# Patient Record
Sex: MALE | Race: BLACK OR AFRICAN AMERICAN | NOT HISPANIC OR LATINO | Employment: FULL TIME | URBAN - METROPOLITAN AREA
[De-identification: names, ages, dates, MRNs, and addresses within clinical notes are randomized per-mention and may not be internally consistent; named-entity substitution may affect disease eponyms.]

---

## 2021-05-17 ENCOUNTER — HISTORICAL (OUTPATIENT)
Dept: ADMINISTRATIVE | Facility: HOSPITAL | Age: 41
End: 2021-05-17

## 2021-05-17 LAB
HAV IGM SERPL QL IA: NONREACTIVE
HBV CORE IGM SERPL QL IA: NONREACTIVE
HBV SURFACE AG SERPL QL IA: NONREACTIVE
HCV AB SERPL QL IA: NONREACTIVE
HIV 1+2 AB+HIV1 P24 AG SERPL QL IA: NONREACTIVE
T PALLIDUM AB SER QL: NONREACTIVE

## 2022-04-11 ENCOUNTER — HISTORICAL (OUTPATIENT)
Dept: ADMINISTRATIVE | Facility: HOSPITAL | Age: 42
End: 2022-04-11

## 2022-04-28 VITALS
SYSTOLIC BLOOD PRESSURE: 144 MMHG | BODY MASS INDEX: 30.3 KG/M2 | OXYGEN SATURATION: 100 % | WEIGHT: 199.94 LBS | HEIGHT: 68 IN | DIASTOLIC BLOOD PRESSURE: 99 MMHG

## 2024-03-03 ENCOUNTER — HOSPITAL ENCOUNTER (EMERGENCY)
Facility: HOSPITAL | Age: 44
Discharge: HOME | End: 2024-03-03
Attending: FAMILY MEDICINE
Payer: COMMERCIAL

## 2024-03-03 VITALS
HEIGHT: 70 IN | OXYGEN SATURATION: 99 % | HEART RATE: 104 BPM | BODY MASS INDEX: 29.13 KG/M2 | WEIGHT: 203.5 LBS | RESPIRATION RATE: 17 BRPM | TEMPERATURE: 98 F | DIASTOLIC BLOOD PRESSURE: 88 MMHG | SYSTOLIC BLOOD PRESSURE: 131 MMHG

## 2024-03-03 DIAGNOSIS — L02.214 ABSCESS OF RIGHT GROIN: Primary | ICD-10-CM

## 2024-03-03 PROCEDURE — 99284 EMERGENCY DEPT VISIT MOD MDM: CPT

## 2024-03-03 RX ORDER — DOXYCYCLINE 100 MG/1
100 CAPSULE ORAL 2 TIMES DAILY
Qty: 20 CAPSULE | Refills: 0 | Status: SHIPPED | OUTPATIENT
Start: 2024-03-03 | End: 2024-03-13

## 2024-03-03 RX ORDER — NAPROXEN 500 MG/1
500 TABLET ORAL 2 TIMES DAILY PRN
Qty: 20 TABLET | Refills: 0 | Status: SHIPPED | OUTPATIENT
Start: 2024-03-03 | End: 2024-03-13

## 2024-03-03 NOTE — ED PROVIDER NOTES
"Encounter Date: 3/3/2024       History     Chief Complaint   Patient presents with    Abscess     States right groin abscess x 3 days next to testicle.  States is draining "a little".       Patient is a 43-year-old gentleman presents emergency room complaints of an abscess to the right groin area that has been ongoing for the last 2 days.  Denies fever chills.  Denies nausea or vomiting.  Patient reports area has been spontaneously draining, but due to his discomfort, decided come the emergency room for evaluation.    The history is provided by the patient.     Review of patient's allergies indicates:  No Known Allergies  History reviewed. No pertinent past medical history.  History reviewed. No pertinent surgical history.  History reviewed. No pertinent family history.  Social History     Tobacco Use    Smoking status: Every Day     Types: Cigarettes    Smokeless tobacco: Never   Substance Use Topics    Alcohol use: Yes    Drug use: Yes     Types: Marijuana     Review of Systems   Constitutional:  Negative for chills, fatigue and fever.   HENT:  Negative for ear pain, rhinorrhea and sore throat.    Eyes:  Negative for photophobia and pain.   Respiratory:  Negative for cough, shortness of breath and wheezing.    Cardiovascular:  Negative for chest pain.   Gastrointestinal:  Negative for abdominal pain, diarrhea, nausea and vomiting.   Genitourinary:  Negative for dysuria.   Neurological:  Negative for dizziness, weakness and headaches.   All other systems reviewed and are negative.      Physical Exam     Initial Vitals [03/03/24 0548]   BP Pulse Resp Temp SpO2   131/88 104 17 98.1 °F (36.7 °C) 99 %      MAP       --         Physical Exam    Nursing note and vitals reviewed.  Constitutional: He appears well-developed and well-nourished.   HENT:   Head: Normocephalic and atraumatic.   Eyes: EOM are normal. Pupils are equal, round, and reactive to light.   Neck: Neck supple.   Normal range of motion.  Cardiovascular:  " Normal rate, regular rhythm, normal heart sounds and intact distal pulses.     Exam reveals no gallop and no friction rub.       No murmur heard.  Pulmonary/Chest: Breath sounds normal. No respiratory distress.   Abdominal: Abdomen is soft. Bowel sounds are normal. He exhibits no distension. There is no abdominal tenderness.   Musculoskeletal:         General: Normal range of motion.      Cervical back: Normal range of motion and neck supple.     Neurological: He is alert and oriented to person, place, and time. He has normal strength.   Skin: Skin is warm and dry. Capillary refill takes less than 2 seconds.   1 cm x 1 cm subcutaneous nodule that is currently actively draining in the right inguinal region, just lateral to the scrotum.  No significant induration.   Psychiatric: He has a normal mood and affect. His behavior is normal. Judgment and thought content normal.         ED Course   Procedures  Labs Reviewed - No data to display       Imaging Results    None          Medications - No data to display  Medical Decision Making  Patient is a 43-year-old gentleman presents emergency room with complaints of a subcutaneous abscess that is spontaneously draining at present.  Discussed with patient regarding options.  Since spontaneously draining, recommend warm compresses to the area.  Will place on Bactrim.  Stable for discharge to home.  ER precautions given for any acute worsening.                                      Clinical Impression:  Final diagnoses:  [L02.214] Abscess of right groin (Primary)          ED Disposition Condition    Discharge Stable          ED Prescriptions       Medication Sig Dispense Start Date End Date Auth. Provider    doxycycline (VIBRAMYCIN) 100 MG Cap Take 1 capsule (100 mg total) by mouth 2 (two) times daily. for 10 days 20 capsule 3/3/2024 3/13/2024 Bradley Wray MD    naproxen (NAPROSYN) 500 MG tablet Take 1 tablet (500 mg total) by mouth 2 (two) times daily as needed  (pain). 20 tablet 3/3/2024 3/13/2024 Bradley Wray MD          Follow-up Information       Follow up With Specialties Details Why Contact Info    [facility] - Emergency Dept Emergency Medicine  As needed, If symptoms worsen [address] [facility] [id number] [phone]    Primary Care Physician  In 5 days               Bradley Wray MD  03/03/24 0607

## 2024-03-19 ENCOUNTER — HOSPITAL ENCOUNTER (EMERGENCY)
Facility: HOSPITAL | Age: 44
Discharge: HOME | End: 2024-03-19
Attending: EMERGENCY MEDICINE
Payer: COMMERCIAL

## 2024-03-19 VITALS
SYSTOLIC BLOOD PRESSURE: 151 MMHG | DIASTOLIC BLOOD PRESSURE: 102 MMHG | BODY MASS INDEX: 28.79 KG/M2 | TEMPERATURE: 98 F | OXYGEN SATURATION: 100 % | HEART RATE: 92 BPM | RESPIRATION RATE: 16 BRPM | WEIGHT: 200.63 LBS

## 2024-03-19 DIAGNOSIS — M25.512 ACUTE PAIN OF LEFT SHOULDER: Primary | ICD-10-CM

## 2024-03-19 PROCEDURE — 99284 EMERGENCY DEPT VISIT MOD MDM: CPT | Mod: 25

## 2024-03-19 PROCEDURE — 63600175 PHARM REV CODE 636 W HCPCS: Performed by: NURSE PRACTITIONER

## 2024-03-19 PROCEDURE — 96372 THER/PROPH/DIAG INJ SC/IM: CPT | Performed by: NURSE PRACTITIONER

## 2024-03-19 RX ORDER — DICLOFENAC SODIUM 75 MG/1
75 TABLET, DELAYED RELEASE ORAL 2 TIMES DAILY PRN
Qty: 20 TABLET | Refills: 0 | Status: SHIPPED | OUTPATIENT
Start: 2024-03-19

## 2024-03-19 RX ORDER — KETOROLAC TROMETHAMINE 30 MG/ML
30 INJECTION, SOLUTION INTRAMUSCULAR; INTRAVENOUS
Status: COMPLETED | OUTPATIENT
Start: 2024-03-19 | End: 2024-03-19

## 2024-03-19 RX ORDER — TIZANIDINE 4 MG/1
8 TABLET ORAL EVERY 6 HOURS PRN
Qty: 30 TABLET | Refills: 0 | Status: SHIPPED | OUTPATIENT
Start: 2024-03-19 | End: 2024-03-29

## 2024-03-19 RX ADMIN — KETOROLAC TROMETHAMINE 30 MG: 30 INJECTION, SOLUTION INTRAMUSCULAR at 03:03

## 2024-03-19 NOTE — ED PROVIDER NOTES
Encounter Date: 3/19/2024       History     Chief Complaint   Patient presents with    Shoulder Pain     PT W CO RT SHOULDER PAIN AFTER FALL ON SAT.  NVI.       The patient presents with left shoulder pain.  The onset was 1 week ago.  The course/duration of symptoms is constant.  Type of injury: fell going down stairs.  Location: left shoulder. Radiating pain: none. The character of symptoms is pain.  The degree of pain is moderate and with certain movements.  The degree of swelling is none.  The exacerbating factor is movement.  There are relieving factors including rest and immobilization.  Risk factors consist of none.  Prior episodes: none.  Therapy today: none.  Associated symptoms: none, he denies LOC and any other injury.  Additional history: none.         Review of patient's allergies indicates:  No Known Allergies  History reviewed. No pertinent past medical history.  History reviewed. No pertinent surgical history.  History reviewed. No pertinent family history.  Social History     Tobacco Use    Smoking status: Every Day     Current packs/day: 0.50     Types: Cigarettes    Smokeless tobacco: Never   Substance Use Topics    Alcohol use: Yes    Drug use: Yes     Types: Marijuana     Review of Systems   Constitutional:  Negative for fever.   HENT:  Negative for sore throat.    Respiratory:  Negative for shortness of breath.    Cardiovascular:  Negative for chest pain.   Gastrointestinal:  Negative for nausea.   Genitourinary:  Negative for dysuria.   Musculoskeletal:  Positive for arthralgias. Negative for back pain.   Skin:  Negative for rash.   Neurological:  Negative for weakness.   Hematological:  Does not bruise/bleed easily.   All other systems reviewed and are negative.      Physical Exam     Initial Vitals [03/19/24 1435]   BP Pulse Resp Temp SpO2   (!) 151/102 92 16 97.5 °F (36.4 °C) 100 %      MAP       --         Physical Exam    Nursing note and vitals reviewed.  Constitutional: He appears  well-developed and well-nourished.   HENT:   Head: Normocephalic and atraumatic.   Neck: Neck supple.   Normal range of motion.  Cardiovascular:  Normal rate, regular rhythm, normal heart sounds and intact distal pulses.           Pulmonary/Chest: Effort normal and breath sounds normal. He has no decreased breath sounds.   Abdominal: Abdomen is soft and flat. Bowel sounds are normal. There is no abdominal tenderness.   Musculoskeletal:         General: Normal range of motion.      Cervical back: Normal range of motion and neck supple.      Comments: Mild ttp left shoulder, FROM, good distal pulses, NVI     Neurological: He is alert and oriented to person, place, and time. He has normal strength.   Skin: Skin is warm and dry.   Psychiatric: He has a normal mood and affect.         ED Course   Procedures  Labs Reviewed - No data to display       Imaging Results              X-Ray Shoulder 2 or More Views Left (Final result)  Result time 03/19/24 15:01:20      Final result by Janett Rosenberg MD (03/19/24 15:01:20)                   Impression:      No acute bony abnormality.      Electronically signed by: Janett Rosenberg  Date:    03/19/2024  Time:    15:01               Narrative:    EXAMINATION:  XR SHOULDER COMPLETE 2 OR MORE VIEWS LEFT    CLINICAL HISTORY:  fall;    COMPARISON:  None.    FINDINGS:  There is no acute fracture or malalignment.  There are mild degenerative changes of the shoulder.  The soft tissues are unremarkable.                                       Medications   ketorolac injection 30 mg (30 mg Intramuscular Given 3/19/24 4583)     Medical Decision Making  The patient presents with left shoulder pain.  The onset was 1 week ago.  The course/duration of symptoms is constant.  Type of injury: fell going down stairs.  Location: left shoulder. Radiating pain: none. The character of symptoms is pain.  The degree of pain is moderate and with certain movements.  The degree of swelling is none.  The  exacerbating factor is movement.  There are relieving factors including rest and immobilization.  Risk factors consist of none.  Prior episodes: none.  Therapy today: none.  Associated symptoms: none, he denies LOC and any other injury.  Additional history: none.       3:31 PM DISPOSITION: The patient is resting comfortably in no acute distress.  He is hemodynamically stable and is without objective evidence for acute process requiring urgent intervention or hospitalization. I provided counseling to patient with regard to condition, the treatment plan, specific conditions for return, and the importance of follow up. Detailed written and verbal instructions provided to patient and he expressed a verbal understanding of the discharge instructions and overall management plan. Reiterated the importance of medication administration and safety and advised patient to follow up with primary care provider in 3-5 days or sooner if needed.  Answered questions at this time. The patient is stable for discharge.       Amount and/or Complexity of Data Reviewed  Radiology: ordered. Decision-making details documented in ED Course.    Risk  Prescription drug management.      Additional MDM:   Differential Diagnosis:   Fracture, septic joint, cellulitis, abscess, gout, RA, OA among others              ED Course as of 03/19/24 1531   Tue Mar 19, 2024   1529 X-Ray Shoulder 2 or More Views Left  Impression:     No acute bony abnormality.   [RB]      ED Course User Index  [RB] Darrion Marin ACNP                           Clinical Impression:  Final diagnoses:  [M25.512] Acute pain of left shoulder (Primary)          ED Disposition Condition    Discharge Stable          ED Prescriptions       Medication Sig Dispense Start Date End Date Auth. Provider    diclofenac (VOLTAREN) 75 MG EC tablet Take 1 tablet (75 mg total) by mouth 2 (two) times daily as needed (pain). 20 tablet 3/19/2024 -- Darrion Marin ACNP    tiZANidine (ZANAFLEX) 4 MG  tablet Take 2 tablets (8 mg total) by mouth every 6 (six) hours as needed (pain or spasms). May cause drowsiness 30 tablet 3/19/2024 3/29/2024 Darrion Marin ACNP          Follow-up Information       Follow up With Specialties Details Why Contact Info    follow up with your primary care provider in 3-5 days        [facility] - Emergency Dept Emergency Medicine  If symptoms worsen [address] [facility] [address] [phone]             Darrion Marin ACNP  03/19/24 0815

## 2024-05-14 ENCOUNTER — HOSPITAL ENCOUNTER (EMERGENCY)
Facility: HOSPITAL | Age: 44
Discharge: HOME | End: 2024-05-14
Attending: STUDENT IN AN ORGANIZED HEALTH CARE EDUCATION/TRAINING PROGRAM
Payer: COMMERCIAL

## 2024-05-14 VITALS
HEART RATE: 90 BPM | WEIGHT: 194.44 LBS | SYSTOLIC BLOOD PRESSURE: 149 MMHG | BODY MASS INDEX: 27.84 KG/M2 | RESPIRATION RATE: 14 BRPM | HEIGHT: 70 IN | TEMPERATURE: 96 F | OXYGEN SATURATION: 99 % | DIASTOLIC BLOOD PRESSURE: 100 MMHG

## 2024-05-14 DIAGNOSIS — J06.9 UPPER RESPIRATORY TRACT INFECTION, UNSPECIFIED TYPE: Primary | ICD-10-CM

## 2024-05-14 LAB
FLUAV AG UPPER RESP QL IA.RAPID: NOT DETECTED
FLUBV AG UPPER RESP QL IA.RAPID: NOT DETECTED
RSV A 5' UTR RNA NPH QL NAA+PROBE: NOT DETECTED
SARS-COV-2 RNA RESP QL NAA+PROBE: NOT DETECTED
STREP A PCR (OHS): NOT DETECTED

## 2024-05-14 PROCEDURE — 0241U COVID/RSV/FLU A&B PCR: CPT | Performed by: NURSE PRACTITIONER

## 2024-05-14 PROCEDURE — 87651 STREP A DNA AMP PROBE: CPT | Performed by: NURSE PRACTITIONER

## 2024-05-14 PROCEDURE — 99283 EMERGENCY DEPT VISIT LOW MDM: CPT

## 2024-05-14 RX ORDER — PROMETHAZINE HYDROCHLORIDE AND DEXTROMETHORPHAN HYDROBROMIDE 6.25; 15 MG/5ML; MG/5ML
5 SYRUP ORAL EVERY 4 HOURS PRN
Qty: 120 ML | Refills: 0 | Status: SHIPPED | OUTPATIENT
Start: 2024-05-14 | End: 2024-05-24

## 2024-05-14 NOTE — ED PROVIDER NOTES
Encounter Date: 5/14/2024       History     Chief Complaint   Patient presents with    Sore Throat     Sore throat x 1 week; hypertensive without prior hx but does not have a PCP. Requesting referral.     The patient presents with occas nonprod cough, sore throat, nasal congestion, subjective fever, no cp or sob, no known covid-19 exposure.  The onset was 1 week ago.  The course/duration of symptoms is constant.  The degree at present is minimal.  The exacerbating factor is none.  The relieving factor is none. Risk factors consist of none.  Prior episodes: occasional.  Associated symptoms: denies chest pain and denies shortness of breath.  Additional history: none.          Review of patient's allergies indicates:  No Known Allergies  History reviewed. No pertinent past medical history.  History reviewed. No pertinent surgical history.  No family history on file.  Social History     Tobacco Use    Smoking status: Every Day     Current packs/day: 0.50     Types: Cigarettes    Smokeless tobacco: Never   Substance Use Topics    Alcohol use: Yes    Drug use: Yes     Types: Marijuana     Review of Systems   Constitutional:  Positive for fever.   HENT:  Positive for congestion and sore throat.    Respiratory:  Positive for cough. Negative for shortness of breath.    Cardiovascular:  Negative for chest pain.   Gastrointestinal:  Negative for nausea.   Genitourinary:  Negative for dysuria.   Musculoskeletal:  Negative for back pain.   Skin:  Negative for rash.   Neurological:  Negative for weakness.   Hematological:  Does not bruise/bleed easily.   All other systems reviewed and are negative.      Physical Exam     Initial Vitals [05/14/24 1128]   BP Pulse Resp Temp SpO2   (!) 149/100 90 14 96.4 °F (35.8 °C) 99 %      MAP       --         Physical Exam    Nursing note and vitals reviewed.  Constitutional: He appears well-developed and well-nourished.   HENT:   Head: Normocephalic and atraumatic.   Right Ear: Tympanic  membrane normal.   Left Ear: Tympanic membrane normal.   Nose: Nose normal.   Mouth/Throat: Uvula is midline, oropharynx is clear and moist and mucous membranes are normal.   Neck: Neck supple.   Normal range of motion.  Cardiovascular:  Normal rate, regular rhythm, normal heart sounds and intact distal pulses.           Pulmonary/Chest: Effort normal and breath sounds normal. He has no decreased breath sounds.   Abdominal: Abdomen is soft and flat. Bowel sounds are normal. There is no abdominal tenderness.   Musculoskeletal:         General: Normal range of motion.      Cervical back: Normal range of motion and neck supple.     Neurological: He is alert and oriented to person, place, and time. He has normal strength.   Skin: Skin is warm and dry.   Psychiatric: He has a normal mood and affect.         ED Course   Procedures  Labs Reviewed   COVID/RSV/FLU A&B PCR - Normal    Narrative:     The Xpert Xpress SARS-CoV-2/FLU/RSV plus is a rapid, multiplexed real-time PCR test intended for the simultaneous qualitative detection and differentiation of SARS-CoV-2, Influenza A, Influenza B, and respiratory syncytial virus (RSV) viral RNA in either nasopharyngeal swab or nasal swab specimens.         STREP GROUP A BY PCR - Normal    Narrative:     The Xpert Xpress Strep A test is a rapid, qualitative in vitro diagnostic test for the detection of Streptococcus pyogenes (Group A ß-hemolytic Streptococcus, Strep A) in throat swab specimens from patients with signs and symptoms of pharyngitis.            Imaging Results    None          Medications - No data to display  Medical Decision Making  The patient presents with occas nonprod cough, sore throat, nasal congestion, subjective fever, no cp or sob, no known covid-19 exposure.  The onset was 1 week ago.  The course/duration of symptoms is constant.  The degree at present is minimal.  The exacerbating factor is none.  The relieving factor is none. Risk factors consist of none.   Prior episodes: occasional.  Associated symptoms: denies chest pain and denies shortness of breath.  Additional history: none.     1:14 PM DISPOSITION: The patient is resting comfortably in no acute distress.  He is hemodynamically stable and is without objective evidence for acute process requiring urgent intervention or hospitalization. I provided counseling to patient with regard to condition, the treatment plan, specific conditions for return, and the importance of follow up. Detailed written and verbal instructions provided to patient and he expressed a verbal understanding of the discharge instructions and overall management plan. Reiterated the importance of medication administration and safety and advised patient to follow up with primary care provider in 3-5 days or sooner if needed.  Answered questions at this time. The patient is stable for discharge.      Amount and/or Complexity of Data Reviewed  Labs: ordered.      Additional MDM:   Differential Diagnosis:   At this time differential diagnosis is but not limited to influenza, strep throat, covid, rsv, viral uri              ED Course as of 05/14/24 1315   Tue May 14, 2024   1309 STREP A PCR ([facility]): Not Detected [RB]   1309 Influenza A, Molecular: Not Detected [RB]   1309 Influenza B, Molecular: Not Detected [RB]   1310 RSV Ag by Molecular Method: Not Detected [RB]   1310 SARS-CoV2 (COVID-19) Qualitative PCR: Not Detected [RB]      ED Course User Index  [RB] Darrion Marin ACNP                           Clinical Impression:  Final diagnoses:  [J06.9] Upper respiratory tract infection, unspecified type (Primary)          ED Disposition Condition    Discharge Stable          ED Prescriptions       Medication Sig Dispense Start Date End Date Auth. Provider    promethazine-dextromethorphan (PROMETHAZINE-DM) 6.25-15 mg/5 mL Syrp Take 5 mLs by mouth every 4 (four) hours as needed (cough). 120 mL 5/14/2024 5/24/2024 Darrion Marin ACNP          Follow-up  Information       Follow up With Specialties Details Why Contact Info    follow up with your primary care provider in 3-5 days        [facility] - Emergency Dept Emergency Medicine  If symptoms worsen [address] [facility] [address] [phone]             Darrion Marin, ACNP  05/14/24 3816

## 2024-05-14 NOTE — DISCHARGE INSTRUCTIONS
It is important that you follow up with your primary care provider or specialist if indicated for further evaluation, workup, and treatment as necessary. The exam and treatment you received in Emergency Department was for an urgent problem and NOT INTENDED AS COMPLETE CARE. It is important that you FOLLOW UP with a doctor for ongoing care. If your symptoms become WORSE or you DO NOT IMPROVE and you are unable to reach your health care provider, you should RETURN to the Emergency Department.

## 2024-09-01 ENCOUNTER — HOSPITAL ENCOUNTER (EMERGENCY)
Facility: HOSPITAL | Age: 44
Discharge: HOME | End: 2024-09-01
Payer: COMMERCIAL

## 2024-09-01 VITALS
RESPIRATION RATE: 18 BRPM | DIASTOLIC BLOOD PRESSURE: 87 MMHG | HEART RATE: 86 BPM | SYSTOLIC BLOOD PRESSURE: 146 MMHG | HEIGHT: 70 IN | WEIGHT: 185 LBS | BODY MASS INDEX: 26.48 KG/M2 | OXYGEN SATURATION: 98 % | TEMPERATURE: 98 F

## 2024-09-01 DIAGNOSIS — H92.02 LEFT EAR PAIN: Primary | ICD-10-CM

## 2024-09-01 DIAGNOSIS — H61.22 IMPACTED CERUMEN OF LEFT EAR: ICD-10-CM

## 2024-09-01 PROCEDURE — 99283 EMERGENCY DEPT VISIT LOW MDM: CPT

## 2024-09-01 RX ORDER — NAPROXEN 500 MG/1
500 TABLET ORAL 2 TIMES DAILY WITH MEALS
Qty: 60 TABLET | Refills: 0 | Status: SHIPPED | OUTPATIENT
Start: 2024-09-01

## 2024-09-01 NOTE — ED PROVIDER NOTES
"Encounter Date: 9/1/2024       History     Chief Complaint   Patient presents with    Otalgia     Pt to ED via POV. Pt reports L ear pain that began a week ago. Pt also reports that ear feels "stopped up"     SeeMDM        Review of patient's allergies indicates:  No Known Allergies  No past medical history on file.  No past surgical history on file.  No family history on file.  Social History     Tobacco Use    Smoking status: Every Day     Current packs/day: 0.50     Types: Cigarettes    Smokeless tobacco: Never   Substance Use Topics    Alcohol use: Yes    Drug use: Yes     Types: Marijuana     Review of Systems   HENT:  Positive for ear pain.    All other systems reviewed and are negative.      Physical Exam     Initial Vitals [09/01/24 1204]   BP Pulse Resp Temp SpO2   (!) 142/94 75 19 98 °F (36.7 °C) 99 %      MAP       --         Physical Exam    Constitutional: He appears well-developed and well-nourished.   HENT:   Head: Normocephalic and atraumatic.   Right Ear: Tympanic membrane normal.   Unable to visualize TM due to cerumen impacted left ear   Eyes: EOM are normal. Pupils are equal, round, and reactive to light.   Neck: Neck supple.   Normal range of motion.  Cardiovascular:  Normal rate, regular rhythm, normal heart sounds and intact distal pulses.           Pulmonary/Chest: Breath sounds normal.   Musculoskeletal:      Cervical back: Normal range of motion and neck supple.           ED Course   Procedures  Labs Reviewed - No data to display       Imaging Results    None          Medications - No data to display  Medical Decision Making  The patient is a 43 y.o. male with a no medical history  who presents to the Emergency Department with a chief complaint of left ear pain. Symptoms began 1 week ago  and have been consist since the onset.  Patient reports  he placed toilet paper, followed by vicks. States he then attempted to stick a q-tip in his ear.  His  pain is currently rated as a 5/10 in severity " and described as aching  with no radiation. Associated symptoms include decrease hearing . Symptoms are aggravated with nothing  . He attempted to use peroxide as alleviating factor with no relief. . No other reported symptoms at this time .    History obtained by patient  Differential diagnosis consist of otitis media, otitis externa, FB in ear canal but not limited too               ED Course as of 09/01/24 1359   Sun Sep 01, 2024   1342 Multiple attempts was made by nursing to irrigate left ear without success.  Will place a ENT referral prior to discharge and have patient follow up. Discuss care and discharge instructions with patient at the bedside.. All questions and concerns have been answered prior to discharge. [CB]      ED Course User Index  [CB] Jocelyn Parr FNP                           Clinical Impression:  Final diagnoses:  [H92.02] Left ear pain (Primary)  [H61.22] Impacted cerumen of left ear          ED Disposition Condition    Discharge Stable          ED Prescriptions       Medication Sig Dispense Start Date End Date Auth. Provider    carbamide peroxide (DEBROX) 6.5 % otic solution Place 5 drops into the left ear 2 (two) times daily. 15 mL 9/1/2024 -- Jocelyn Parr FNP    naproxen (NAPROSYN) 500 MG tablet Take 1 tablet (500 mg total) by mouth 2 (two) times daily with meals. 60 tablet 9/1/2024 -- Jocelyn Parr FNP          Follow-up Information    None          Jocelyn Parr FNP  09/01/24 1235       Jocelyn Parr FNP  09/01/24 1400

## 2024-09-01 NOTE — FIRST PROVIDER EVALUATION
Medical screening examination initiated.  I have conducted a focused provider triage encounter, findings are as follows:    Brief history of present illness:  44 y/o M presents to the ED with left ear pain onset 1 week.     There were no vitals filed for this visit.    Pertinent physical exam:  AAA x 3    Brief workup plan:  Meds    Preliminary workup initiated; this workup will be continued and followed by the physician or advanced practice provider that is assigned to the patient when roomed.

## 2024-09-01 NOTE — DISCHARGE INSTRUCTIONS
Thanks for letting us take care of you today!  It is our goal to give you courteous care and to keep you comfortable and informed, if you have any questions before you leave I will be happy to try and answer them.    Here is some advice after your visit:      Your visit in the emergency department is NOT definitive care - please follow-up with your primary care doctor and/or specialist within 1-2 days.  Please return if you have any worsening in your condition or if you have any other concerns.    If you had radiology exams like an XRAY or CT in the emergency Department the interpreation on them may be preliminary - there may be less time sensitive findings on the reports please obtain these reports within 24 hours from the hospital or by using your out on your mobile phone to access records.  Bring these to your primary care doctor and/or specialist for further review of incidental findings.

## 2024-12-04 ENCOUNTER — HOSPITAL ENCOUNTER (EMERGENCY)
Facility: HOSPITAL | Age: 44
Discharge: HOME | End: 2024-12-04
Attending: EMERGENCY MEDICINE
Payer: COMMERCIAL

## 2024-12-04 VITALS
BODY MASS INDEX: 27.2 KG/M2 | DIASTOLIC BLOOD PRESSURE: 113 MMHG | HEART RATE: 87 BPM | RESPIRATION RATE: 16 BRPM | OXYGEN SATURATION: 100 % | TEMPERATURE: 98 F | SYSTOLIC BLOOD PRESSURE: 159 MMHG | WEIGHT: 190 LBS | HEIGHT: 70 IN

## 2024-12-04 DIAGNOSIS — S46.912A STRAIN OF LEFT SHOULDER, INITIAL ENCOUNTER: Primary | ICD-10-CM

## 2024-12-04 PROCEDURE — 96372 THER/PROPH/DIAG INJ SC/IM: CPT

## 2024-12-04 PROCEDURE — 99284 EMERGENCY DEPT VISIT MOD MDM: CPT | Mod: 25

## 2024-12-04 PROCEDURE — 63600175 PHARM REV CODE 636 W HCPCS

## 2024-12-04 RX ORDER — KETOROLAC TROMETHAMINE 30 MG/ML
30 INJECTION, SOLUTION INTRAMUSCULAR; INTRAVENOUS
Status: COMPLETED | OUTPATIENT
Start: 2024-12-04 | End: 2024-12-04

## 2024-12-04 RX ORDER — METHOCARBAMOL 500 MG/1
1000 TABLET, FILM COATED ORAL 3 TIMES DAILY PRN
Qty: 30 TABLET | Refills: 0 | Status: SHIPPED | OUTPATIENT
Start: 2024-12-04 | End: 2024-12-09

## 2024-12-04 RX ORDER — KETOROLAC TROMETHAMINE 30 MG/ML
60 INJECTION, SOLUTION INTRAMUSCULAR; INTRAVENOUS
Status: DISCONTINUED | OUTPATIENT
Start: 2024-12-04 | End: 2024-12-04

## 2024-12-04 RX ADMIN — KETOROLAC TROMETHAMINE 30 MG: 30 INJECTION, SOLUTION INTRAMUSCULAR at 10:12

## 2024-12-04 NOTE — ED PROVIDER NOTES
Encounter Date: 12/4/2024       History     Chief Complaint   Patient presents with    Arm Injury     C/O non traumatic L shoulder/arm/neck pain x2 weeks. Denies numbness/tingling     The patient is a 44 y.o. male who presents to the Emergency Department with a chief complaint of left shoulder pain. Patient reports repetitive heavy lifting at work. Symptoms began 2 weeks ago and have been constant since onset. His pain is currently rated as a 6/10 in severity and described as aching with no radiation. Associated symptoms include left sided neck pain. Symptoms are aggravated with movement and there are no alleviating factors. The patient denies numbness or tingling to extremities. He reports taking nothing prior to arrival with no relief of symptoms. No other reported symptoms at this time     The history is provided by the patient. No  was used.   Arm Injury   The incident occurred several weeks ago. The incident occurred at work. The injury mechanism was a pulled muscle. Associated symptoms include neck pain. Pertinent negatives include no chest pain, no nausea and no weakness.     Review of patient's allergies indicates:  No Known Allergies  History reviewed. No pertinent past medical history.  History reviewed. No pertinent surgical history.  No family history on file.  Social History     Tobacco Use    Smoking status: Every Day     Current packs/day: 0.50     Types: Cigarettes    Smokeless tobacco: Never   Substance Use Topics    Alcohol use: Yes    Drug use: Yes     Types: Marijuana     Review of Systems   Constitutional:  Negative for chills and fever.   HENT:  Negative for sore throat.    Respiratory:  Negative for shortness of breath.    Cardiovascular:  Negative for chest pain.   Gastrointestinal:  Negative for nausea.   Genitourinary:  Negative for dysuria, frequency and urgency.   Musculoskeletal:  Positive for arthralgias and neck pain. Negative for back pain.   Skin:  Negative for  rash.   Neurological:  Negative for weakness.   Hematological:  Does not bruise/bleed easily.   All other systems reviewed and are negative.      Physical Exam     Initial Vitals [12/04/24 0948]   BP Pulse Resp Temp SpO2   (!) 159/113 87 16 98.4 °F (36.9 °C) 100 %      MAP       --         Physical Exam    Nursing note and vitals reviewed.  Constitutional: Vital signs are normal. He appears well-developed and well-nourished.   HENT:   Head: Normocephalic.   Right Ear: Hearing and tympanic membrane normal.   Left Ear: Hearing and tympanic membrane normal. Mouth/Throat: Uvula is midline, oropharynx is clear and moist and mucous membranes are normal.   Eyes: Conjunctivae and EOM are normal. Pupils are equal, round, and reactive to light.   Cardiovascular:  Normal rate, regular rhythm, normal heart sounds and normal pulses.           Pulmonary/Chest: Effort normal and breath sounds normal.   Abdominal: Abdomen is soft. Bowel sounds are normal. There is no abdominal tenderness.   Musculoskeletal:      Right shoulder: Normal.      Left shoulder: Tenderness present. No swelling. Normal range of motion. Normal strength. Normal pulse.      Cervical back: Tenderness present. No spinous process tenderness or muscular tenderness.      Thoracic back: Normal.      Lumbar back: Normal.      Comments: All other adjacent joints normal      Lymphadenopathy:     He has no cervical adenopathy.   Neurological: He is alert. GCS eye subscore is 4. GCS verbal subscore is 5. GCS motor subscore is 6.   Skin: Skin is warm, dry and intact. Capillary refill takes less than 2 seconds.         ED Course   Procedures  Labs Reviewed - No data to display       Imaging Results              X-Ray Cervical Spine AP And Lateral (Final result)  Result time 12/04/24 10:24:12      Final result by Joao Roque MD (12/04/24 10:24:12)                   Impression:      No acute radiographic findings.      Electronically signed by: Joao  Mya  Date:    12/04/2024  Time:    10:24               Narrative:    EXAMINATION:  XR CERVICAL SPINE AP LATERAL    CLINICAL HISTORY:  neck pain;    COMPARISON:  No priors    FINDINGS:  Frontal, lateral and open mouth views of the cervical spine.  There is reversal of the normal lordotic curvature which could be seen with muscle spasm.  Otherwise no significant alignment abnormality.  The odontoid appears grossly intact and the C1-C2 relationship normal on the open mouth view. There is no prevertebral soft tissue swelling.  There are mild degenerative changes.                                       X-Ray Shoulder 2 or More Views Left (Final result)  Result time 12/04/24 10:13:07      Final result by Joao Roque MD (12/04/24 10:13:07)                   Impression:      No acute findings.      Electronically signed by: Joao Roque  Date:    12/04/2024  Time:    10:13               Narrative:    EXAMINATION:  XR SHOULDER COMPLETE 2 OR MORE VIEWS LEFT    CLINICAL HISTORY:  left shoulder pain;    COMPARISON:  19 March 2024    FINDINGS:  Three views left shoulder.  There is no acute fracture or dislocation.  Possible mild AC joint separation stable.                                       Medications   ketorolac injection 30 mg (30 mg Intramuscular Given 12/4/24 1047)     Medical Decision Making  The patient is a 44 y.o. male who presents to the Emergency Department with a chief complaint of left shoulder pain. Patient reports repetitive heavy lifting at work. Symptoms began 2 weeks ago and have been constant since onset. His pain is currently rated as a 6/10 in severity and described as aching with no radiation. Associated symptoms include left sided neck pain. Symptoms are aggravated with movement and there are no alleviating factors. The patient denies numbness or tingling to extremities. He reports taking nothing prior to arrival with no relief of symptoms. No other reported symptoms at this time     Judging by  the patient's chief complaint and pertinent history, the patient has the following possible differential diagnoses, including but not limited to the following.  Some of these are deemed to be lower likelihood and some more likely based on my physical exam and history combined with possible lab work and/or imaging studies.   Please see the pertinent studies, and refer to the HPI.  Some of these diagnoses will take further evaluation to fully rule out, perhaps as an outpatient and the patient was encouraged to follow up when discharged for more comprehensive evaluation.    Shoulder strain, fracture, cervical strain     Problems Addressed:  Strain of left shoulder, initial encounter: acute illness or injury    Amount and/or Complexity of Data Reviewed  Radiology: ordered. Decision-making details documented in ED Course.    Risk  Prescription drug management.               ED Course as of 12/04/24 1220   Wed Dec 04, 2024   1206 X-Ray Shoulder 2 or More Views Left  Impression:     No acute findings   [LM]   1206 X-Ray Cervical Spine AP And Lateral  Impression:     No acute radiographic findings.   [LM]      ED Course User Index  [LM] Cristina Morales NP                           Clinical Impression:  Final diagnoses:  [S46.912A] Strain of left shoulder, initial encounter (Primary)          ED Disposition Condition    Discharge Stable          ED Prescriptions       Medication Sig Dispense Start Date End Date Auth. Provider    methocarbamoL (ROBAXIN) 500 MG Tab Take 2 tablets (1,000 mg total) by mouth 3 (three) times daily as needed (Muscle Spasms). 30 tablet 12/4/2024 12/9/2024 Cristina Morales NP          Follow-up Information       Follow up With Specialties Details Why Contact Info    Please contact [phone] to establish care with a primary care provider  Schedule an appointment as soon as possible for a visit                Cristina Morales NP  12/04/24 1220

## 2024-12-04 NOTE — FIRST PROVIDER EVALUATION
"Medical screening examination initiated.  I have conducted a focused provider triage encounter, findings are as follows:    Brief history of present illness:  44 year old male presents to Er with c/o left shoulder pain and left sided neck pain x 2 weeks. Denies paresthesias.     Vitals:    12/04/24 0948   Pulse: 87   Resp: 16   Temp: 98.4 °F (36.9 °C)   SpO2: 100%   Weight: 86.2 kg (190 lb)   Height: 5' 10" (1.778 m)       Pertinent physical exam:  Awake and alert, NAD    Brief workup plan:  imaging, meds    Preliminary workup initiated; this workup will be continued and followed by the physician or advanced practice provider that is assigned to the patient when roomed.  "